# Patient Record
Sex: MALE | Race: BLACK OR AFRICAN AMERICAN | NOT HISPANIC OR LATINO | Employment: OTHER | URBAN - METROPOLITAN AREA
[De-identification: names, ages, dates, MRNs, and addresses within clinical notes are randomized per-mention and may not be internally consistent; named-entity substitution may affect disease eponyms.]

---

## 2019-07-21 ENCOUNTER — HOSPITAL ENCOUNTER (INPATIENT)
Facility: HOSPITAL | Age: 79
LOS: 2 days | Discharge: HOME | DRG: 378 | End: 2019-07-23
Attending: FAMILY MEDICINE | Admitting: FAMILY MEDICINE
Payer: MEDICARE

## 2019-07-21 DIAGNOSIS — R19.5 OCCULT GI BLEEDING: ICD-10-CM

## 2019-07-21 DIAGNOSIS — D62 ACUTE BLOOD LOSS ANEMIA: Primary | ICD-10-CM

## 2019-07-21 DIAGNOSIS — D46.9 MDS (MYELODYSPLASTIC SYNDROME): ICD-10-CM

## 2019-07-21 PROBLEM — I10 HYPERTENSION: Status: ACTIVE | Noted: 2019-07-21

## 2019-07-21 PROCEDURE — 86920 COMPATIBILITY TEST SPIN: CPT

## 2019-07-21 PROCEDURE — 36415 COLL VENOUS BLD VENIPUNCTURE: CPT

## 2019-07-21 PROCEDURE — 86901 BLOOD TYPING SEROLOGIC RH(D): CPT

## 2019-07-21 PROCEDURE — 94761 N-INVAS EAR/PLS OXIMETRY MLT: CPT

## 2019-07-21 PROCEDURE — 11000001 HC ACUTE MED/SURG PRIVATE ROOM

## 2019-07-21 RX ORDER — ONDANSETRON 2 MG/ML
4 INJECTION INTRAMUSCULAR; INTRAVENOUS EVERY 8 HOURS PRN
Status: DISCONTINUED | OUTPATIENT
Start: 2019-07-21 | End: 2019-07-23 | Stop reason: HOSPADM

## 2019-07-21 RX ORDER — RAMELTEON 8 MG/1
8 TABLET ORAL NIGHTLY PRN
Status: DISCONTINUED | OUTPATIENT
Start: 2019-07-21 | End: 2019-07-23 | Stop reason: HOSPADM

## 2019-07-21 RX ORDER — ACETAMINOPHEN 325 MG/1
650 TABLET ORAL EVERY 4 HOURS PRN
Status: DISCONTINUED | OUTPATIENT
Start: 2019-07-21 | End: 2019-07-23 | Stop reason: HOSPADM

## 2019-07-21 RX ORDER — SODIUM CHLORIDE 9 MG/ML
1000 INJECTION, SOLUTION INTRAVENOUS CONTINUOUS
Status: ACTIVE | OUTPATIENT
Start: 2019-07-22 | End: 2019-07-22

## 2019-07-21 RX ORDER — SODIUM CHLORIDE 0.9 % (FLUSH) 0.9 %
10 SYRINGE (ML) INJECTION
Status: DISCONTINUED | OUTPATIENT
Start: 2019-07-21 | End: 2019-07-23 | Stop reason: HOSPADM

## 2019-07-21 RX ORDER — PANTOPRAZOLE SODIUM 40 MG/10ML
40 INJECTION, POWDER, LYOPHILIZED, FOR SOLUTION INTRAVENOUS DAILY
Status: DISCONTINUED | OUTPATIENT
Start: 2019-07-22 | End: 2019-07-22

## 2019-07-21 RX ADMIN — SODIUM CHLORIDE 1000 ML: 0.9 INJECTION, SOLUTION INTRAVENOUS at 10:07

## 2019-07-21 NOTE — PLAN OF CARE
(Physician in Lead of Transfers)   Outside Transfer Acceptance Note / Regional Referral Center    Transferring Physician: Tyrell Zaldivar MD ()    Accepting Physician: Chanel Moon MD    Date of Acceptance: 07/21/2019    Transferring Facility: [facility] --> [facility], Dr Baum     Reason for Transfer: Occult GIB    Report from Transferring Physician/Hospital course: 79M with HTN, chronic pancytopenia 2/2 myelodysplastic syndrome (on Vidaza), with transfusions around every 2 weeks, h/o hemolytic anemia, admitted to [facility] for low Hb mid-5 on routine labs done by his cancer center.  Initially, no report of GIB, so heme/onc consulted, no evidence of hemolytic anemia, then his GIB presented itself with hematochezia (dark red stool), and he went for EGD that was negative.  He continued to bleed and went for repeat EGD and c-scope today, no source of bleed found.  Hb this morning was 6.7, then transfused and 2pm Hb is mid-8.  Rec'd a total of 7 u pRBCs.  WBC 1.5 and plts 70K - chronic and baseline.     Discussed with Dr Corbett, GI staff at [place], who spoke with Dr Gresham who is on tomorrow, and he will be able to do double balloon enteroscopy tomorrow.  Discussed with Dr Baum who will admit.     VS: tachy 104, /90s, O2 sat wnl on RA. Afeb, RR wnl.    To Do List: Admit to . NPO after midnight.     Upon patient arrival to floor, please call  on call.    Chanel Moon MD  Hospital Medicine Staff  Pager: [phone]  Cell: [phone]

## 2019-07-22 ENCOUNTER — ANESTHESIA (OUTPATIENT)
Dept: ENDOSCOPY | Facility: HOSPITAL | Age: 79
DRG: 378 | End: 2019-07-22
Payer: MEDICARE

## 2019-07-22 ENCOUNTER — ANESTHESIA EVENT (OUTPATIENT)
Dept: ENDOSCOPY | Facility: HOSPITAL | Age: 79
DRG: 378 | End: 2019-07-22
Payer: MEDICARE

## 2019-07-22 LAB
ABO + RH BLD: NORMAL
ANION GAP SERPL CALC-SCNC: 5 MMOL/L (ref 8–16)
APTT BLDCRRT: 27.3 SEC (ref 21–32)
BASOPHILS # BLD AUTO: 0 K/UL (ref 0–0.2)
BASOPHILS NFR BLD: 0 % (ref 0–1.9)
BLD GP AB SCN CELLS X3 SERPL QL: NORMAL
BLD PROD TYP BPU: NORMAL
BLOOD UNIT EXPIRATION DATE: NORMAL
BLOOD UNIT TYPE CODE: 9500
BLOOD UNIT TYPE: NORMAL
BUN SERPL-MCNC: 9 MG/DL (ref 8–23)
CALCIUM SERPL-MCNC: 8 MG/DL (ref 8.7–10.5)
CHLORIDE SERPL-SCNC: 108 MMOL/L (ref 95–110)
CO2 SERPL-SCNC: 25 MMOL/L (ref 23–29)
CODING SYSTEM: NORMAL
CREAT SERPL-MCNC: 0.8 MG/DL (ref 0.5–1.4)
DIFFERENTIAL METHOD: ABNORMAL
DISPENSE STATUS: NORMAL
EOSINOPHIL # BLD AUTO: 0 K/UL (ref 0–0.5)
EOSINOPHIL NFR BLD: 1.3 % (ref 0–8)
ERYTHROCYTE [DISTWIDTH] IN BLOOD BY AUTOMATED COUNT: 18.1 % (ref 11.5–14.5)
EST. GFR  (AFRICAN AMERICAN): >60 ML/MIN/1.73 M^2
EST. GFR  (NON AFRICAN AMERICAN): >60 ML/MIN/1.73 M^2
GLUCOSE SERPL-MCNC: 120 MG/DL (ref 70–110)
HCT VFR BLD AUTO: 20 % (ref 40–54)
HGB BLD-MCNC: 6.8 G/DL (ref 14–18)
INR PPP: 1 (ref 0.8–1.2)
LYMPHOCYTES # BLD AUTO: 1.4 K/UL (ref 1–4.8)
LYMPHOCYTES NFR BLD: 91.4 % (ref 18–48)
MCH RBC QN AUTO: 33.3 PG (ref 27–31)
MCHC RBC AUTO-ENTMCNC: 34 G/DL (ref 32–36)
MCV RBC AUTO: 98 FL (ref 82–98)
MONOCYTES # BLD AUTO: 0.1 K/UL (ref 0.3–1)
MONOCYTES NFR BLD: 5.3 % (ref 4–15)
NEUTROPHILS # BLD AUTO: 0 K/UL (ref 1.8–7.7)
NEUTROPHILS NFR BLD: 2 % (ref 38–73)
PLATELET # BLD AUTO: 71 K/UL (ref 150–350)
PLATELET BLD QL SMEAR: ABNORMAL
PMV BLD AUTO: 10.3 FL (ref 9.2–12.9)
POTASSIUM SERPL-SCNC: 3.4 MMOL/L (ref 3.5–5.1)
PROTHROMBIN TIME: 10.8 SEC (ref 9–12.5)
RBC # BLD AUTO: 2.04 M/UL (ref 4.6–6.2)
SODIUM SERPL-SCNC: 138 MMOL/L (ref 136–145)
TRANS ERYTHROCYTES VOL PATIENT: NORMAL ML
WBC # BLD AUTO: 1.51 K/UL (ref 3.9–12.7)

## 2019-07-22 PROCEDURE — 85610 PROTHROMBIN TIME: CPT

## 2019-07-22 PROCEDURE — 85730 THROMBOPLASTIN TIME PARTIAL: CPT

## 2019-07-22 PROCEDURE — 94761 N-INVAS EAR/PLS OXIMETRY MLT: CPT

## 2019-07-22 PROCEDURE — 44376 SMALL BOWEL ENDOSCOPY: CPT | Performed by: INTERNAL MEDICINE

## 2019-07-22 PROCEDURE — 37000009 HC ANESTHESIA EA ADD 15 MINS: Performed by: INTERNAL MEDICINE

## 2019-07-22 PROCEDURE — 37000008 HC ANESTHESIA 1ST 15 MINUTES: Performed by: INTERNAL MEDICINE

## 2019-07-22 PROCEDURE — 27201238 HC BALLOON, OVERTUBE (ANY): Performed by: INTERNAL MEDICINE

## 2019-07-22 PROCEDURE — P9021 RED BLOOD CELLS UNIT: HCPCS

## 2019-07-22 PROCEDURE — 11000001 HC ACUTE MED/SURG PRIVATE ROOM

## 2019-07-22 PROCEDURE — 25000003 PHARM REV CODE 250: Performed by: FAMILY MEDICINE

## 2019-07-22 PROCEDURE — 63600175 PHARM REV CODE 636 W HCPCS: Performed by: FAMILY MEDICINE

## 2019-07-22 PROCEDURE — 85027 COMPLETE CBC AUTOMATED: CPT

## 2019-07-22 PROCEDURE — 80048 BASIC METABOLIC PNL TOTAL CA: CPT

## 2019-07-22 PROCEDURE — 25000003 PHARM REV CODE 250: Performed by: NURSE PRACTITIONER

## 2019-07-22 PROCEDURE — 63600175 PHARM REV CODE 636 W HCPCS: Performed by: NURSE ANESTHETIST, CERTIFIED REGISTERED

## 2019-07-22 PROCEDURE — 25000003 PHARM REV CODE 250: Performed by: NURSE ANESTHETIST, CERTIFIED REGISTERED

## 2019-07-22 PROCEDURE — C9113 INJ PANTOPRAZOLE SODIUM, VIA: HCPCS | Performed by: FAMILY MEDICINE

## 2019-07-22 PROCEDURE — 36415 COLL VENOUS BLD VENIPUNCTURE: CPT

## 2019-07-22 PROCEDURE — 25500020 PHARM REV CODE 255: Performed by: HOSPITALIST

## 2019-07-22 PROCEDURE — 85007 BL SMEAR W/DIFF WBC COUNT: CPT

## 2019-07-22 RX ORDER — PROPOFOL 10 MG/ML
VIAL (ML) INTRAVENOUS
Status: DISCONTINUED | OUTPATIENT
Start: 2019-07-22 | End: 2019-07-22

## 2019-07-22 RX ORDER — SODIUM CHLORIDE 9 MG/ML
INJECTION, SOLUTION INTRAVENOUS CONTINUOUS PRN
Status: DISCONTINUED | OUTPATIENT
Start: 2019-07-22 | End: 2019-07-22

## 2019-07-22 RX ORDER — PROPOFOL 10 MG/ML
VIAL (ML) INTRAVENOUS CONTINUOUS PRN
Status: DISCONTINUED | OUTPATIENT
Start: 2019-07-22 | End: 2019-07-22

## 2019-07-22 RX ORDER — ONDANSETRON 2 MG/ML
INJECTION INTRAMUSCULAR; INTRAVENOUS
Status: DISCONTINUED | OUTPATIENT
Start: 2019-07-22 | End: 2019-07-22

## 2019-07-22 RX ORDER — HYDROCODONE BITARTRATE AND ACETAMINOPHEN 500; 5 MG/1; MG/1
TABLET ORAL
Status: DISCONTINUED | OUTPATIENT
Start: 2019-07-22 | End: 2019-07-23 | Stop reason: HOSPADM

## 2019-07-22 RX ORDER — LIDOCAINE HCL/PF 100 MG/5ML
SYRINGE (ML) INTRAVENOUS
Status: DISCONTINUED | OUTPATIENT
Start: 2019-07-22 | End: 2019-07-22

## 2019-07-22 RX ADMIN — PROPOFOL 70 MG: 10 INJECTION, EMULSION INTRAVENOUS at 01:07

## 2019-07-22 RX ADMIN — LIDOCAINE HYDROCHLORIDE 75 MG: 20 INJECTION, SOLUTION INTRAVENOUS at 01:07

## 2019-07-22 RX ADMIN — OCTREOTIDE ACETATE 25 MCG/HR: 1000 INJECTION, SOLUTION INTRAVENOUS; SUBCUTANEOUS at 07:07

## 2019-07-22 RX ADMIN — PROPOFOL 150 MCG/KG/MIN: 10 INJECTION, EMULSION INTRAVENOUS at 01:07

## 2019-07-22 RX ADMIN — IOHEXOL 130 ML: 350 INJECTION, SOLUTION INTRAVENOUS at 05:07

## 2019-07-22 RX ADMIN — PANTOPRAZOLE SODIUM 8 MG/HR: 40 INJECTION, POWDER, FOR SOLUTION INTRAVENOUS at 04:07

## 2019-07-22 RX ADMIN — PANTOPRAZOLE SODIUM 8 MG/HR: 40 INJECTION, POWDER, FOR SOLUTION INTRAVENOUS at 03:07

## 2019-07-22 RX ADMIN — SODIUM CHLORIDE: 0.9 INJECTION, SOLUTION INTRAVENOUS at 01:07

## 2019-07-22 RX ADMIN — ONDANSETRON 8 MG: 2 INJECTION, SOLUTION INTRAMUSCULAR; INTRAVENOUS at 01:07

## 2019-07-22 NOTE — CONSULTS
[facility]  Consult Note    CC: melena    HPI 79 y.o. male  Mr. 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 for acute on chronic symptomatic anemia requiring multiple transfusions that is associated with obscure GI bleed.     He was initially admitted for symptomatic anemia with Hgb of 5.5 noted on routine outpatient labs. In total he required 7 uPRBC without significant or expected improvement in Hgb. GI evaluated and he underwent EGD with push on 7/19 that was normal. Heme/Onc evaluated, initially believed anemia was 2/2 underlying MDS & Vidaza tx, did not believe picture was due to AIHA. Peripheral smear negative for blasts and negative DIC w/u. Then, Mr. Garcia developed tyler melena without hematemesis. Underwent EGD & colonoscopy 7/21 that found no bleeding source, so he was transferred here for balloon enteroscopy.    Currently he feels fatigued but otherwise is at his baseline. Wants to eat. Last melenic stool was yesterday prior to endoscopies. Denies fevers, chills, chest pain, dyspnea, dysphagia/odynophagia, abdominal pain, n/v, rectal pain, dysuria. Has never had GIB in the past. Last colonoscopy prior to hospitalization was 1-2 years ago, found 2 polyps. No NSAID use. No tobacco or EtOH. No family history GI malignancies. Transferred on protonix and octreotide gtts.    Past Medical History:   Diagnosis Date    Cancer     Skin cancers removed    Hypertension        Past Surgical History:   Procedure Laterality Date    BACK SURGERY      And left shoulder x2    EYE SURGERY      JOINT REPLACEMENT       Social History  Social History     Tobacco Use    Smoking status: Former Smoker     Last attempt to quit: 7/21/2019    Smokeless tobacco: Never Used   Substance Use Topics    Alcohol use: Not Currently    Drug use: Never     Family history: no family history  "of GI malignancies    Review of Systems  General ROS: +fatigue, negative for chills, fever or weight loss  Psychological ROS: negative for hallucination, depression or suicidal ideation  Ophthalmic ROS: negative for blurry vision, photophobia or eye pain  ENT ROS: negative for epistaxis, sore throat or rhinorrhea  Respiratory ROS: no cough, shortness of breath, or wheezing  Cardiovascular ROS: no chest pain or dyspnea on exertion  Gastrointestinal ROS: +black/bloody stools, no abdominal pain, nausea, vomiting  Genito-Urinary ROS: no dysuria, trouble voiding, or hematuria  Musculoskeletal ROS: negative for gait disturbance or muscular weakness  Neurological ROS: no syncope or seizures; no ataxia  Dermatological ROS: negative for pruritis, rash and jaundice    Physical Examination  BP (!) 119/57 (BP Location: Left arm, Patient Position: Lying)   Pulse 82   Temp 97.3 °F (36.3 °C) (Oral)   Resp 18   Ht 5' 9" (1.753 m)   Wt 97.7 kg (215 lb 6.2 oz)   SpO2 (!) 91%   BMI 31.81 kg/m²   General appearance: alert, cooperative, no distress  HENT: Normocephalic, atraumatic, neck symmetrical, no nasal discharge   Eyes: conjunctivae/corneas clear, PERRL, EOM's intact  Lungs: clear to auscultation bilaterally, no dullness to percussion bilaterally  Heart: holosystolic murmur, regular rate and rhythm without rub; no displacement of the PMI   Abdomen: soft, non-tender; bowel sounds normoactive; no organomegaly  Extremities: trace hand edema, otherwise extremities symmetric; no clubbing, cyanosis, or LE edema  Integument: Skin color, texture, turgor normal; no rashes; hair distrubution normal  Neurologic: Alert and oriented X 3, normal strength, normal coordination and gait  Psychiatric: no pressured speech; normal affect; no evidence of impaired cognition     Labs: labs reviewed, pancytopenia with ANC 0, hgb 6.8, plts 71, INR 1.0, BMP unremarkable    Imaging: reviewed OSH EGD report, normal exam    Assessment:   Mr. 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    Plan:   - proceed with upper single balloon enteroscopy today (pt consented)  - transfuse Hgb <7, plts <50 given bleeding    Thank you for this consult. Please call with any questions.    Le Ferraro MD, FRANCISCO  [facility], PGY-4  Cell: [phone]  Pager: [phone]

## 2019-07-22 NOTE — HPI
Constance Garcia is a 79 year old male with a past medical history of Hypertension, Chronic Pancytopenia 2/2 myelodysplastic syndrome (on Vidaza), with transfusions around every 2 weeks, and h/o hemolytic anemia. He lives in [place], [place] with his wife. His PCP is Dr. Harmeet Pickering.    He presented to [facility] 7/21/2019 for a low Hemoglobin of mid-5 on routine labs done by his cancer center. Initially, no report of Gastro Intestinal Bleed, so heme/onc consulted, no evidence of Hemolytic anemia, then his GIB presented itself with Hematochezia (dark red stool), and he went for EGD that was negative.  He continued to bleed and went for repeat EGD and c-scope earlier in the day, no source of bleed found.  Hgb this morning was 6.7, then transfused and 2pm Hgb is mid-8.  Patient received a total of 7 u pRBCs.  WBC 1.5 and plts 70K - chronic and baseline. Discussed with Dr Corbett, GI staff at [place], who spoke with Dr Gresham who is on tomorrow, and he will be able to do double balloon enteroscopy tomorrow. Patient transferred to [facility] and admitted to Hospital medicine for further evaluation and treatment.

## 2019-07-22 NOTE — HOSPITAL COURSE
7/22 pt seen at bedside, no having bloody BM at this time, pt had an EGD today, no acute finding, awaiting CTA, if neg then bleeding is related to his hemotologic disease.       There was no evidence of significant pathology in the entire jejunum.       The proximal ileum appeared normal.  Recommendation:     CTA as next step in evaluation    7/23 the CTA abd was negative for acute evidence of GI bleed. Given the GI recs/scope findings/CTA results and pt's medical history significant for MDS, the pt can be discharged home today and follow up with his PCP and hematologist.  Case discussed with patient at bedside prior to discharge

## 2019-07-22 NOTE — PROGRESS NOTES
[facility]  Progress Note    Patient Name: Constance Garcia  MRN: [id number]  Patient Class: IP- Inpatient   Admission Date: 7/21/2019  Length of Stay: 1 days  Attending Physician: Quirino Soler DO  Primary Care Provider: Harmeet Pickering MD        Subjective:     Principal Problem:Occult GI bleeding      HPI:  Constance Garcia is a 79 year old male with a past medical history of Hypertension, Chronic Pancytopenia 2/2 myelodysplastic syndrome (on Vidaza), with transfusions around every 2 weeks, and h/o hemolytic anemia. He lives in [place], [place] with his wife. His PCP is Dr. Harmeet Pickering.    He presented to [facility] 7/21/2019 for a low Hemoglobin of mid-5 on routine labs done by his cancer center. Initially, no report of Gastro Intestinal Bleed, so heme/onc consulted, no evidence of Hemolytic anemia, then his GIB presented itself with Hematochezia (dark red stool), and he went for EGD that was negative.  He continued to bleed and went for repeat EGD and c-scope earlier in the day, no source of bleed found.  Hgb this morning was 6.7, then transfused and 2pm Hgb is mid-8.  Patient received a total of 7 u pRBCs.  WBC 1.5 and plts 70K - chronic and baseline. Discussed with Dr Corbett, GI staff at [place], who spoke with Dr Gresham who is on tomorrow, and he will be able to do double balloon enteroscopy tomorrow. Patient transferred to [facility] and admitted to Hospital medicine for further evaluation and treatment.    Overview/Hospital Course:  7/22 pt seen at bedside, no having bloody BM at this time, pt had an EGD today, no acute finding, awaiting CTA, if neg then bleeding is related to his hemotologic disease.       There was no evidence of significant pathology in the entire jejunum.       The proximal ileum appeared normal.  Recommendation:     CTA as next step in evaluation    Interval History:     Review of Systems   Constitutional: Positive for fatigue.  Negative for activity change.   HENT: Negative for nosebleeds.    Eyes: Negative for photophobia and visual disturbance.   Respiratory: Negative for cough and shortness of breath.    Cardiovascular: Negative for chest pain and palpitations.   Gastrointestinal: Positive for blood in stool. Negative for nausea.   Neurological: Negative for dizziness and syncope.   Psychiatric/Behavioral: Negative for agitation and decreased concentration.     Objective:     Vital Signs (Most Recent):  Temp: 97.4 °F (36.3 °C) (07/22/19 1517)  Pulse: 66 (07/22/19 1517)  Resp: 16 (07/22/19 1517)  BP: 120/63 (07/22/19 1517)  SpO2: 98 % (07/22/19 1517) Vital Signs (24h Range):  Temp:  [96.7 °F (35.9 °C)-98.2 °F (36.8 °C)] 97.4 °F (36.3 °C)  Pulse:  [66-82] 66  Resp:  [16-22] 16  SpO2:  [91 %-98 %] 98 %  BP: (103-139)/(56-71) 120/63     Weight: 97.7 kg (215 lb 6.2 oz)  Body mass index is 31.81 kg/m².    Intake/Output Summary (Last 24 hours) at 7/22/2019 1535  Last data filed at 7/22/2019 1345  Gross per 24 hour   Intake 100 ml   Output 1325 ml   Net -1225 ml      Physical Exam   Constitutional: He is oriented to person, place, and time. He appears well-developed and well-nourished.   HENT:   Head: Normocephalic and atraumatic.   Eyes: EOM are normal.   Neck: Normal range of motion. Neck supple.   Cardiovascular: Normal rate.   Pulmonary/Chest: Effort normal and breath sounds normal.   Abdominal: Soft. He exhibits no distension.   Musculoskeletal: Normal range of motion. He exhibits no deformity.   Neurological: He is alert and oriented to person, place, and time.   Psychiatric: He has a normal mood and affect. His behavior is normal. Judgment and thought content normal.   Nursing note and vitals reviewed.      Significant Labs: .   Recent Labs   Lab 07/22/19 0622   WBC 1.51*   HGB 6.8*   HCT 20.0*   PLT 71*     Recent Labs   Lab 07/22/19  0622      K 3.4*      CO2 25   BUN 9   CREATININE 0.8   *   CALCIUM 8.0*     Recent  Labs   Lab 07/22/19  0622   INR 1.0      No results for input(s): CPK, CPKMB, MB, TROPONINI in the last 72 hours.  No results for input(s): POCTGLUCOSE in the last 168 hours.  No results found for: HGBA1C  Scheduled Meds:  Continuous Infusions:   octreotide (SANDOSTATIN) infusion Stopped (07/22/19 0330)    pantoprazole 40 mg in dextrose 5 % 100 mL infusion (ready to mix system) 8 mg/hr (07/22/19 1528)     As Needed: sodium chloride, acetaminophen, ondansetron, promethazine (PHENERGAN) IVPB, ramelteon, sodium chloride 0.9%      Significant Imaging: EGD results;   The esophagus was normal.       The stomach was normal.       The examined duodenum was normal.       There was no evidence of significant pathology in the entire jejunum.       The proximal ileum appeared normal.  Impression:           - Normal upper balloon enteroscopy                        - History of recurrent anemia due to MDS                         complicated by pancytopenia                        - Recent diagnosis of reported overt GI bleeding                         without a defined source by EGD, colonoscopy and                         now balloon enteroscopy  Recommendation:       CTA as next step in evaluation      Assessment/Plan:      * Occult GI bleeding  NS 125ml/hr  Protonix 40 mg daily  Monitor CBC  NPO  GI consult pending    7/22  EGD:       There was no evidence of significant pathology in the entire jejunum.       The proximal ileum appeared normal.  Recommendation:     CTA as next step in evaluation    Hypertension  Hyperlipidemia    Continue home meds      Acute blood loss anemia  Anemia is secondary to his underlying MDS and associated Vidaza treatment as well as possible blood loss.  Monitor H&H  Prepare 2 units PRBCs  Transfuse Hgb <7          VTE Risk Mitigation (From admission, onward)        Ordered     Place sequential compression device  Until discontinued      07/21/19 2234     IP VTE HIGH RISK PATIENT  Once       07/21/19 2234                Quirino Soler DO  Department of Hospital Medicine   [facility]

## 2019-07-22 NOTE — PLAN OF CARE
BRIEF [facility] NOTE    Chart reviewed. Mr. 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    He was initially admitted for Hgb of 5.5 noted on routine outpatient labs. In total he required 7 uPRBC without significant or expected improvement in Hgb. GI evaluated and he underwent EGD with push on 7/19 that was normal. Heme/Onc evaluated, initially believed anemia was 2/2 underlying MDS & Vidaza tx, did not believe picture was due to AIHA. Peripheral smear negative for blasts and negative DIC w/u. Then, Mr. Garcia developed tyler melena without hematemesis. Underwent EGD & colonoscopy today 7/21 that found no bleeding source.    Plan is for upper single balloon enteroscopy tomorrow 7/22 for suspected small bowel bleeding.    NPO at midnight.    Full consultation note to follow.    Le Ferraro MD, FRANCISCO  [facility], PGY-4  Cell: [phone]  Pager: [phone]

## 2019-07-22 NOTE — ANESTHESIA POSTPROCEDURE EVALUATION
Anesthesia Post Evaluation    Patient: Constance Garcia    Procedure(s) Performed: Procedure(s) (LRB):  single upper balloon enteroscopy (N/A)    Final Anesthesia Type: MAC  Patient location during evaluation: GI PACU  Patient participation: Yes- Able to Participate  Level of consciousness: awake and alert and oriented  Post-procedure vital signs: reviewed and stable  Pain management: adequate  Airway patency: patent  PONV status at discharge: No PONV  Anesthetic complications: no      Cardiovascular status: blood pressure returned to baseline  Respiratory status: unassisted, spontaneous ventilation and room air  Hydration status: euvolemic  Follow-up not needed.          Vitals Value Taken Time   /57 7/22/2019 11:06 AM   Temp 36.8 °C (98.2 °F) 7/22/2019 11:06 AM   Pulse 78 7/22/2019 11:36 AM   Resp 18 7/22/2019  7:58 AM   SpO2 96 % 7/22/2019 11:06 AM         No case tracking events are documented in the log.      Pain/Michelle Score: No data recorded

## 2019-07-22 NOTE — PLAN OF CARE
VN rounds:  VN cued into pt's room with pt's permission.  Pt sitting on side of bed in low position with call bell near and wife at bedside. Fall risk protocol discussed with pt.  VN instructed to call for assistance.  Pt aware and agreeable.  Pt denies pain, anxiety and nausea.  Educated pt that IV will need to be started prior to CTA.   No acute distress noted.  Allowed time for questions.  Will continue to be available and intervene as needed.

## 2019-07-22 NOTE — PLAN OF CARE
Dr. Greshamvisited at bedside, discussed findings and recommendations with patient and family member; all questions asked and answered. Verbalized understanding of information given.

## 2019-07-22 NOTE — PLAN OF CARE
Problem: Adult Inpatient Plan of Care  Goal: Plan of Care Review  Outcome: Ongoing (interventions implemented as appropriate)  POC discussed with patient and his spouse. IV fluids initiated. IV medications running at ordered rates to port site on left chest wall. Patient has denied any pain or nausea since arrival to hospital room. Informed patient of order for NPO after midnight for possible balloon enteroscopy. Patient voiced understanding of NPO order and will comply.  Patient states that he has been having tyler blood in his stool but has not had a BM since admission. Will be monitoring lab work for H&H results. Will report to MD if level has dropped. Bed alarm activated, patient provided with call light. Will continue to monitor.

## 2019-07-22 NOTE — TRANSFER OF CARE
"Anesthesia Transfer of Care Note    Patient: Constance Garcia    Procedure(s) Performed: Procedure(s) (LRB):  single upper balloon enteroscopy (N/A)    Patient location: GI    Anesthesia Type: MAC    Transport from OR: Transported from OR on room air with adequate spontaneous ventilation    Post pain: adequate analgesia    Post assessment: no apparent anesthetic complications    Post vital signs: stable    Level of consciousness: awake, alert and oriented    Nausea/Vomiting: no nausea/vomiting    Complications: none    Transfer of care protocol was followed      Last vitals:   Visit Vitals  BP (!) 119/57   Pulse 78   Temp 36.8 °C (98.2 °F)   Resp 18   Ht 5' 9" (1.753 m)   Wt 97.7 kg (215 lb 6.2 oz)   SpO2 96%   BMI 31.81 kg/m²     "

## 2019-07-22 NOTE — ASSESSMENT & PLAN NOTE
Anemia is secondary to his underlying MDS and associated Vidaza treatment as well as possible blood loss.  Monitor H&H  Prepare 2 units PRBCs  Transfuse Hgb <7

## 2019-07-22 NOTE — H&P
[facility] Hospital Medicine  History & Physical    Patient Name: Constance Garcia  MRN: [id number]  Admission Date: 7/21/2019  Attending Physician: Betzaida Nevarez*   Primary Care Provider: Harmeet Pickering MD         Patient information was obtained from patient and ER records.     Subjective:     Principal Problem:Occult GI bleeding    Chief Complaint: Abnormal labs       HPI: Constance Garcia is a 79 year old male with a past medical history of Hypertension, Chronic Pancytopenia 2/2 myelodysplastic syndrome (on Vidaza), with transfusions around every 2 weeks, and h/o hemolytic anemia. He lives in [place], [place] with his wife. His PCP is Dr. Harmeet Pickering.    He presented to [facility] 7/21/2019 for a low Hemoglobin of mid-5 on routine labs done by his cancer center. Initially, no report of Gastro Intestinal Bleed, so heme/onc consulted, no evidence of Hemolytic anemia, then his GIB presented itself with Hematochezia (dark red stool), and he went for EGD that was negative.  He continued to bleed and went for repeat EGD and c-scope earlier in the day, no source of bleed found.  Hgb this morning was 6.7, then transfused and 2pm Hgb is mid-8.  Patient received a total of 7 u pRBCs.  WBC 1.5 and plts 70K - chronic and baseline. Discussed with Dr Corbett, GI staff at [place], who spoke with Dr Gresham who is on tomorrow, and he will be able to do double balloon enteroscopy tomorrow. Patient transferred to [facility] and admitted to Hospital medicine for further evaluation and treatment.    Past Medical History:   Diagnosis Date    Cancer     Skin cancers removed    Hypertension        Past Surgical History:   Procedure Laterality Date    BACK SURGERY      And left shoulder x2    EYE SURGERY      JOINT REPLACEMENT         Review of patient's allergies indicates:   Allergen Reactions    Lortab [hydrocodone-acetaminophen] Other (See Comments)     Unknown reaction          No current facility-administered medications on file prior to encounter.      No current outpatient medications on file prior to encounter.     Family History     Problem Relation (Age of Onset)    No Known Problems Mother, Father        Tobacco Use    Smoking status: Former Smoker     Last attempt to quit: 7/21/2019    Smokeless tobacco: Never Used   Substance and Sexual Activity    Alcohol use: Not Currently    Drug use: Never    Sexual activity: Not on file     Review of Systems   Constitutional: Negative for fatigue and fever.   HENT: Negative for congestion and nosebleeds.    Eyes: Negative for visual disturbance.   Respiratory: Negative for cough, choking and shortness of breath.    Cardiovascular: Negative for chest pain and palpitations.   Gastrointestinal: Positive for blood in stool. Negative for abdominal distention, abdominal pain, nausea and vomiting.   Endocrine: Negative for polydipsia and polyuria.   Genitourinary: Negative for dysuria and flank pain.   Musculoskeletal: Negative for arthralgias and joint swelling.   Skin: Negative for color change and rash.   Allergic/Immunologic: Negative for food allergies.   Neurological: Positive for weakness. Negative for dizziness and light-headedness.   Psychiatric/Behavioral: Negative for agitation.     Objective:     Vital Signs (Most Recent):  Temp: 97.6 °F (36.4 °C) (07/21/19 2227)  Pulse: 77 (07/21/19 2227)  Resp: 18 (07/21/19 2227)  BP: 139/71 (07/21/19 2227)  SpO2: 97 % (07/21/19 2227) Vital Signs (24h Range):  Temp:  [96.7 °F (35.9 °C)-97.9 °F (36.6 °C)] 97.6 °F (36.4 °C)  Pulse:  [71-77] 77  Resp:  [17-22] 18  SpO2:  [97 %-98 %] 97 %  BP: (130-139)/(64-71) 139/71     Weight: 98.5 kg (217 lb 2.5 oz)  Body mass index is 32.07 kg/m².    Physical Exam   Constitutional: He appears well-developed and well-nourished.   HENT:   Head: Normocephalic and atraumatic.   Eyes: Pupils are equal, round, and reactive to light.   Neck: Normal range of  motion. No JVD present.   Cardiovascular: Normal rate and regular rhythm.   Pulmonary/Chest: Effort normal and breath sounds normal. No respiratory distress.   Abdominal: Soft. Bowel sounds are normal. He exhibits no distension.   Musculoskeletal: Normal range of motion.   Neurological: He is alert.   Skin: Skin is warm and dry. Capillary refill takes less than 2 seconds.   Psychiatric: He has a normal mood and affect.         CRANIAL NERVES     CN III, IV, VI   Pupils are equal, round, and reactive to light.       Significant Labs: All pertinent labs within the past 24 hours have been reviewed.    Significant Imaging: I have reviewed all pertinent imaging results/findings within the past 24 hours.    Assessment/Plan:     * Occult GI bleeding  NS 125ml/hr  Protonix 40 mg daily  Monitor CBC  NPO  GI consult pending    Hypertension  Hyperlipidemia    Continue home meds      Acute blood loss anemia  Anemia is secondary to his underlying MDS and associated Vidaza treatment as well as possible blood loss.  Monitor H&H  Prepare 2 units PRBCs  Transfuse Hgb <7          VTE Risk Mitigation (From admission, onward)        Ordered     Place sequential compression device  Until discontinued      07/21/19 2234     IP VTE HIGH RISK PATIENT  Once      07/21/19 2234             Crystal Nuñez, APRN, FNP-C  Department of Hospital Medicine   [facility]

## 2019-07-22 NOTE — ANESTHESIA PREPROCEDURE EVALUATION
07/22/2019  Constance Garcia is a 79 y.o., male admitted with occult GI bleeding/anemia. HX of Hypertension, Chronic Pancytopenia 2/2 myelodysplastic syndrome ,  S/p multiple transfusions, now needs for balloon enteroscopy.    Past Medical History:   Diagnosis Date    Cancer     Skin cancers removed    Hypertension          Anesthesia Evaluation    I have reviewed the Patient Summary Reports.    I have reviewed the Nursing Notes.   I have reviewed the Medications.     Review of Systems  Social:  Former Smoker        Physical Exam  General:  Obesity    Airway/Jaw/Neck:  Airway Findings: Mallampati: IV Jaw/Neck Findings:  Neck ROM: Extension Decreased, Mild      Dental:  Dental Findings: Upper partial dentures   Chest/Lungs:  Chest/Lungs Clear    Heart/Vascular:  Heart Findings: Normal       Mental Status:  Mental Status Findings:  Alert and Oriented       Lab Results   Component Value Date    WBC 1.51 (LL) 07/22/2019    HGB 6.8 (L) 07/22/2019    HCT 20.0 (L) 07/22/2019    PLT 71 (L) 07/22/2019     07/22/2019    K 3.4 (L) 07/22/2019     07/22/2019    CREATININE 0.8 07/22/2019    BUN 9 07/22/2019    CO2 25 07/22/2019    INR 1.0 07/22/2019     Normal sinus rhythm  Left ventricular hypertrophy with QRS widening and repolarization abnormality  Nonspecific intraventricular conduction delay  Abnormal ECG  When compared with ECG of 15-SEP-2018 13:27,  No significant change was found  Confirmed by Yamil Ramirez M.D. ([id number]) on 7/18/2019 11:12:38 AM    Anesthesia Plan  Type of Anesthesia, risks & benefits discussed:  Anesthesia Type:  MAC, general  Patient's Preference:   Intra-op Monitoring Plan:   Intra-op Monitoring Plan Comments:   Post Op Pain Control Plan:   Post Op Pain Control Plan Comments:   Induction:    Beta Blocker:  Patient is not currently on a Beta-Blocker (No further documentation required).        Informed Consent: Patient understands risks and agrees with Anesthesia plan.  Questions answered. Anesthesia consent signed with patient.  ASA Score: 3     Day of Surgery Review of History & Physical:            Ready For Surgery From Anesthesia Perspective.

## 2019-07-22 NOTE — [employer] PATIENT INSTRUCTIONS
Discharge Summary/Instructions after an Endoscopic Procedure  Patient Name: Constance Garcia  Patient MRN: [id number]  Patient YOB: 1940 Monday, July 22, 2019  Pasquale Gresham MD  RESTRICTIONS:  During your procedure today, you received medications for sedation.  These   medications may affect your judgment, balance and coordination.  Therefore,   for 24 hours, you have the following restrictions:   - DO NOT drive a car, operate machinery, make legal/financial decisions,   sign important papers or drink alcohol.    ACTIVITY:  Today: no heavy lifting, straining or running due to procedural   sedation/anesthesia.  The following day: return to full activity including work.  DIET:  Eat and drink normally unless instructed otherwise.     TREATMENT FOR COMMON SIDE EFFECTS:  - Mild abdominal pain, nausea, belching, bloating or excessive gas:  rest,   eat lightly and use a heating pad.  - Sore Throat: treat with throat lozenges and/or gargle with warm salt   water.  - Because air was used during the procedure, expelling large amounts of air   from your rectum or belching is normal.  - If a bowel prep was taken, you may not have a bowel movement for 1-3 days.    This is normal.  SYMPTOMS TO WATCH FOR AND REPORT TO YOUR PHYSICIAN:  1. Abdominal pain or bloating, other than gas cramps.  2. Chest pain.  3. Back pain.  4. Signs of infection such as: chills or fever occurring within 24 hours   after the procedure.  5. Rectal bleeding, which would show as bright red, maroon, or black stools.   (A tablespoon of blood from the rectum is not serious, especially if   hemorrhoids are present.)  6. Vomiting.  7. Weakness or dizziness.  GO DIRECTLY TO THE NEAREST EMERGENCY ROOM IF YOU HAVE ANY OF THE FOLLOWING:      Difficulty breathing              Chills and/or fever over 101 F   Persistent vomiting and/or vomiting blood   Severe abdominal pain   Severe chest pain   Black, tarry stools   Bleeding- more than one tablespoon   Any  other symptom or condition that you feel may need urgent attention  Your doctor recommends these additional instructions:  If any biopsies were taken, your doctors clinic will contact you in 1 to 2   weeks with any results.  CTA as next step in evaluation  For questions, problems or results please call your physician - Pasquale Gresham MD at Work:  [phone].  EMERGENCY PHONE NUMBER: [phone],  LAB RESULTS: [phone]  IF A COMPLICATION OR EMERGENCY SITUATION ARISES AND YOU ARE UNABLE TO REACH   YOUR PHYSICIAN - GO DIRECTLY TO THE EMERGENCY ROOM.  MD Pasquale Cortes MD  7/22/2019 1:53:35 PM  This report has been verified and signed electronically.  [employer]

## 2019-07-22 NOTE — SUBJECTIVE & OBJECTIVE
Past Medical History:   Diagnosis Date    Cancer     Skin cancers removed    Hypertension        Past Surgical History:   Procedure Laterality Date    BACK SURGERY      And left shoulder x2    EYE SURGERY      JOINT REPLACEMENT         Review of patient's allergies indicates:   Allergen Reactions    Lortab [hydrocodone-acetaminophen] Other (See Comments)     Unknown reaction         No current facility-administered medications on file prior to encounter.      No current outpatient medications on file prior to encounter.     Family History     Problem Relation (Age of Onset)    No Known Problems Mother, Father        Tobacco Use    Smoking status: Former Smoker     Last attempt to quit: 7/21/2019    Smokeless tobacco: Never Used   Substance and Sexual Activity    Alcohol use: Not Currently    Drug use: Never    Sexual activity: Not on file     Review of Systems   Constitutional: Negative for fatigue and fever.   HENT: Negative for congestion and nosebleeds.    Eyes: Negative for visual disturbance.   Respiratory: Negative for cough, choking and shortness of breath.    Cardiovascular: Negative for chest pain and palpitations.   Gastrointestinal: Positive for blood in stool. Negative for abdominal distention, abdominal pain, nausea and vomiting.   Endocrine: Negative for polydipsia and polyuria.   Genitourinary: Negative for dysuria and flank pain.   Musculoskeletal: Negative for arthralgias and joint swelling.   Skin: Negative for color change and rash.   Allergic/Immunologic: Negative for food allergies.   Neurological: Positive for weakness. Negative for dizziness and light-headedness.   Psychiatric/Behavioral: Negative for agitation.     Objective:     Vital Signs (Most Recent):  Temp: 97.6 °F (36.4 °C) (07/21/19 2227)  Pulse: 77 (07/21/19 2227)  Resp: 18 (07/21/19 2227)  BP: 139/71 (07/21/19 2227)  SpO2: 97 % (07/21/19 2227) Vital Signs (24h Range):  Temp:  [96.7 °F (35.9 °C)-97.9 °F (36.6 °C)] 97.6 °F  (36.4 °C)  Pulse:  [71-77] 77  Resp:  [17-22] 18  SpO2:  [97 %-98 %] 97 %  BP: (130-139)/(64-71) 139/71     Weight: 98.5 kg (217 lb 2.5 oz)  Body mass index is 32.07 kg/m².    Physical Exam   Constitutional: He appears well-developed and well-nourished.   HENT:   Head: Normocephalic and atraumatic.   Eyes: Pupils are equal, round, and reactive to light.   Neck: Normal range of motion. No JVD present.   Cardiovascular: Normal rate and regular rhythm.   Pulmonary/Chest: Effort normal and breath sounds normal. No respiratory distress.   Abdominal: Soft. Bowel sounds are normal. He exhibits no distension.   Musculoskeletal: Normal range of motion.   Neurological: He is alert.   Skin: Skin is warm and dry. Capillary refill takes less than 2 seconds.   Psychiatric: He has a normal mood and affect.         CRANIAL NERVES     CN III, IV, VI   Pupils are equal, round, and reactive to light.       Significant Labs: All pertinent labs within the past 24 hours have been reviewed.    Significant Imaging: I have reviewed all pertinent imaging results/findings within the past 24 hours.

## 2019-07-22 NOTE — PLAN OF CARE
Pt is from [facility] in [place], [place].  He will transfer back there once treatment completed here.  His spouse, Suzie, is at his bedside.  He was independent prior to hospital admission.  White board updated with CM name and contact information.  Discharge brochure provided.  Pt encouraged to call with any questions or concerns.  Cm will continue to follow pt through transitions of care and assist with any discharge needs.       07/22/19 1605   Discharge Assessment   Assessment Type Discharge Planning Assessment   Confirmed/corrected address and phone number on facesheet? Yes   Assessment information obtained from? Patient;Caregiver   Communicated expected length of stay with patient/caregiver yes   Prior to hospitilization cognitive status: Alert/Oriented   Prior to hospitalization functional status: Independent   Current cognitive status: Alert/Oriented   Current Functional Status: Independent   Facility Arrived From: [facility]   Lives With spouse   Able to Return to Prior Arrangements yes   Is patient able to care for self after discharge? Yes   Patient's perception of discharge disposition home or selfcare   Readmission Within the Last 30 Days no previous admission in last 30 days   Patient currently being followed by outpatient case management? No   Patient currently receives any other outside agency services? No   Equipment Currently Used at Home none   Do you have any problems affording any of your prescribed medications? No   Is the patient taking medications as prescribed? yes   Does the patient have transportation home? Yes   Transportation Anticipated family or friend will provide   Discharge Plan A Other   Discharge Plan B Other   DME Needed Upon Discharge  none   Patient/Family in Agreement with Plan yes     Mason Avilez RN,   [phone]

## 2019-07-22 NOTE — SUBJECTIVE & OBJECTIVE
Interval History:     Review of Systems   Constitutional: Positive for fatigue. Negative for activity change.   HENT: Negative for nosebleeds.    Eyes: Negative for photophobia and visual disturbance.   Respiratory: Negative for cough and shortness of breath.    Cardiovascular: Negative for chest pain and palpitations.   Gastrointestinal: Positive for blood in stool. Negative for nausea.   Neurological: Negative for dizziness and syncope.   Psychiatric/Behavioral: Negative for agitation and decreased concentration.     Objective:     Vital Signs (Most Recent):  Temp: 97.4 °F (36.3 °C) (07/22/19 1517)  Pulse: 66 (07/22/19 1517)  Resp: 16 (07/22/19 1517)  BP: 120/63 (07/22/19 1517)  SpO2: 98 % (07/22/19 1517) Vital Signs (24h Range):  Temp:  [96.7 °F (35.9 °C)-98.2 °F (36.8 °C)] 97.4 °F (36.3 °C)  Pulse:  [66-82] 66  Resp:  [16-22] 16  SpO2:  [91 %-98 %] 98 %  BP: (103-139)/(56-71) 120/63     Weight: 97.7 kg (215 lb 6.2 oz)  Body mass index is 31.81 kg/m².    Intake/Output Summary (Last 24 hours) at 7/22/2019 1535  Last data filed at 7/22/2019 1345  Gross per 24 hour   Intake 100 ml   Output 1325 ml   Net -1225 ml      Physical Exam   Constitutional: He is oriented to person, place, and time. He appears well-developed and well-nourished.   HENT:   Head: Normocephalic and atraumatic.   Eyes: EOM are normal.   Neck: Normal range of motion. Neck supple.   Cardiovascular: Normal rate.   Pulmonary/Chest: Effort normal and breath sounds normal.   Abdominal: Soft. He exhibits no distension.   Musculoskeletal: Normal range of motion. He exhibits no deformity.   Neurological: He is alert and oriented to person, place, and time.   Psychiatric: He has a normal mood and affect. His behavior is normal. Judgment and thought content normal.   Nursing note and vitals reviewed.      Significant Labs: .   Recent Labs   Lab 07/22/19  0622   WBC 1.51*   HGB 6.8*   HCT 20.0*   PLT 71*     Recent Labs   Lab 07/22/19  0622      K 3.4*       CO2 25   BUN 9   CREATININE 0.8   *   CALCIUM 8.0*     Recent Labs   Lab 07/22/19  0622   INR 1.0      No results for input(s): CPK, CPKMB, MB, TROPONINI in the last 72 hours.  No results for input(s): POCTGLUCOSE in the last 168 hours.  No results found for: HGBA1C  Scheduled Meds:  Continuous Infusions:   octreotide (SANDOSTATIN) infusion Stopped (07/22/19 0330)    pantoprazole 40 mg in dextrose 5 % 100 mL infusion (ready to mix system) 8 mg/hr (07/22/19 1528)     As Needed: sodium chloride, acetaminophen, ondansetron, promethazine (PHENERGAN) IVPB, ramelteon, sodium chloride 0.9%      Significant Imaging: EGD results;   The esophagus was normal.       The stomach was normal.       The examined duodenum was normal.       There was no evidence of significant pathology in the entire jejunum.       The proximal ileum appeared normal.  Impression:           - Normal upper balloon enteroscopy                        - History of recurrent anemia due to MDS                         complicated by pancytopenia                        - Recent diagnosis of reported overt GI bleeding                         without a defined source by EGD, colonoscopy and                         now balloon enteroscopy  Recommendation:       CTA as next step in evaluation

## 2019-07-22 NOTE — OR NURSING
Patient added on to endo schedule for Upper SBE with Dr. Gresham. Report taken from Sho in care of patient this morning. NPO status appropriate for exam. Blood transfusion to be started shortly. Chart reviewed.

## 2019-07-22 NOTE — ASSESSMENT & PLAN NOTE
NS 125ml/hr  Protonix 40 mg daily  Monitor CBC  NPO  GI consult pending    7/22  EGD:       There was no evidence of significant pathology in the entire jejunum.       The proximal ileum appeared normal.  Recommendation:     CTA as next step in evaluation

## 2019-07-22 NOTE — PLAN OF CARE
Pt transferred down to Endoscopy, Sho ARAGON at bedside, carrying blood for blood transfusion.  Patient's VS obtained.  Blood verified and started to L chest POC and IV pete and protonix rotated to R hand PIV.  Wife at Bedside.

## 2019-07-22 NOTE — PLAN OF CARE
Anterograde balloon enteroscopy unrevealing. There is a report of hemotochezia in this case but no bleeding sources have been found by EGD, colonoscopy or video capsule study.   Plan:   CTA today   If CTA is negative for active bleeding sources, this patient's recurrent anemia may be primarily related to his hematologic disease rather than GI bleeding   Regular diet after CTA

## 2019-07-23 ENCOUNTER — TELEPHONE (OUTPATIENT)
Dept: HEMATOLOGY/ONCOLOGY | Facility: CLINIC | Age: 79
End: 2019-07-23

## 2019-07-23 VITALS
RESPIRATION RATE: 17 BRPM | SYSTOLIC BLOOD PRESSURE: 125 MMHG | TEMPERATURE: 98 F | BODY MASS INDEX: 32.13 KG/M2 | HEIGHT: 69 IN | WEIGHT: 216.94 LBS | HEART RATE: 74 BPM | OXYGEN SATURATION: 97 % | DIASTOLIC BLOOD PRESSURE: 58 MMHG

## 2019-07-23 PROBLEM — D46.9 MDS (MYELODYSPLASTIC SYNDROME): Status: ACTIVE | Noted: 2019-07-23

## 2019-07-23 LAB
BASOPHILS # BLD AUTO: ABNORMAL K/UL (ref 0–0.2)
BASOPHILS NFR BLD: 0 % (ref 0–1.9)
DIFFERENTIAL METHOD: ABNORMAL
EOSINOPHIL # BLD AUTO: ABNORMAL K/UL (ref 0–0.5)
EOSINOPHIL NFR BLD: 0 % (ref 0–8)
ERYTHROCYTE [DISTWIDTH] IN BLOOD BY AUTOMATED COUNT: 17.1 % (ref 11.5–14.5)
HCT VFR BLD AUTO: 22.7 % (ref 40–54)
HGB BLD-MCNC: 7.6 G/DL (ref 14–18)
LYMPHOCYTES # BLD AUTO: ABNORMAL K/UL (ref 1–4.8)
LYMPHOCYTES NFR BLD: 100 % (ref 18–48)
MCH RBC QN AUTO: 31.9 PG (ref 27–31)
MCHC RBC AUTO-ENTMCNC: 33.5 G/DL (ref 32–36)
MCV RBC AUTO: 95 FL (ref 82–98)
MONOCYTES # BLD AUTO: ABNORMAL K/UL (ref 0.3–1)
MONOCYTES NFR BLD: 0 % (ref 4–15)
NEUTROPHILS NFR BLD: 0 % (ref 38–73)
PLATELET # BLD AUTO: 74 K/UL (ref 150–350)
PLATELET BLD QL SMEAR: ABNORMAL
PMV BLD AUTO: 10.6 FL (ref 9.2–12.9)
RBC # BLD AUTO: 2.38 M/UL (ref 4.6–6.2)
WBC # BLD AUTO: 1.46 K/UL (ref 3.9–12.7)

## 2019-07-23 PROCEDURE — 99223 1ST HOSP IP/OBS HIGH 75: CPT | Mod: ,,, | Performed by: INTERNAL MEDICINE

## 2019-07-23 PROCEDURE — 85025 COMPLETE CBC W/AUTO DIFF WBC: CPT

## 2019-07-23 PROCEDURE — 94761 N-INVAS EAR/PLS OXIMETRY MLT: CPT

## 2019-07-23 PROCEDURE — 63600175 PHARM REV CODE 636 W HCPCS: Performed by: HOSPITALIST

## 2019-07-23 PROCEDURE — 99223 PR INITIAL HOSPITAL CARE,LEVL III: ICD-10-PCS | Mod: ,,, | Performed by: INTERNAL MEDICINE

## 2019-07-23 RX ORDER — HEPARIN 100 UNIT/ML
5 SYRINGE INTRAVENOUS
Status: DISCONTINUED | OUTPATIENT
Start: 2019-07-23 | End: 2019-07-23 | Stop reason: HOSPADM

## 2019-07-23 RX ADMIN — HEPARIN 500 UNITS: 100 SYRINGE at 12:07

## 2019-07-23 NOTE — PLAN OF CARE
Discharge rounds on patient. Discussed followup appointments, blue discharge folder, discharge nurse will go over home medications and reasons for medications and final discharge instructions. All patient/caregiver questions answered. Patient verbalized understanding.      ok to fax d/c summ to Dr. Jayme Mak  fax #  [phone]    pt has transportation to home and help at home        07/23/19 4745   Final Note   Assessment Type Final Discharge Note   Anticipated Discharge Disposition Home   What phone number can be called within the next 1-3 days to see how you are doing after discharge? [phone]   Hospital Follow Up  Appt(s) scheduled? Yes   Discharge plans and expectations educations in teach back method with documentation complete? Yes   Right Care Referral Info   Post Acute Recommendation No Care   Referral Type   (no care )

## 2019-07-23 NOTE — NURSING
Pt and spouse given discharge instructions and avs packet.  Pt he takes home medication but is not sure what he taken. Nurse found transfer papers from [facility]   With medications he was taking. Nurse spoke with Dr. Soler about medications currently taking and Per Dr. Soler okay to take at home. Pt and spouse informed okay to start taking medications again as he did at home.

## 2019-07-23 NOTE — PROGRESS NOTES
[facility]  Consult Note     CC: melena     HPI 79 y.o. male  Mr. 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     He was initially admitted for symptomatic anemia with Hgb of 5.5 noted on routine outpatient labs. In total he required 7 uPRBC without significant or expected improvement in Hgb. GI evaluated and he underwent EGD with push on 7/19 that was normal. Heme/Onc evaluated, initially believed anemia was 2/2 underlying MDS & Vidaza tx, did not believe picture was due to AIHA. Peripheral smear negative for blasts and negative DIC w/u. Then, Mr. Garcia developed tyler melena without hematemesis. Underwent EGD & colonoscopy 7/21 that found no bleeding source, so he was transferred here for balloon enteroscopy. Balloon enteroscopy was performed by Dr. Gresham, which did not reveal the source of bleeding. Thus, CTA abd/pelvis was performed, which was also negative.          Past Medical History:   Diagnosis Date    Cancer       Skin cancers removed    Hypertension                 Past Surgical History:   Procedure Laterality Date    BACK SURGERY         And left shoulder x2    EYE SURGERY        JOINT REPLACEMENT          Social History  Social History            Tobacco Use    Smoking status: Former Smoker       Last attempt to quit: 7/21/2019    Smokeless tobacco: Never Used   Substance Use Topics    Alcohol use: Not Currently    Drug use: Never      Family history: no family history of GI malignancies     Subjective:  Pt seen and examined this morning at 0800. Afebrile, VSS, AaOx3. Returning to bed from sink with wife at bedside. Discussed with patient EGD findings (normal) and CT findings (normal). Pt continues to feel fatigued, but does feel much  "better this morning and is glad to be eating again. Still no BMs as has not had full meal in several days. Feels ready to be discharged.   Denies CP, SOB, N/V, F/C, HA/dizziness.    Review of Systems  General ROS: +fatigue, negative for chills, fever or weight loss  Psychological ROS: negative for hallucination, depression or suicidal ideation  Ophthalmic ROS: negative for blurry vision, photophobia or eye pain  ENT ROS: negative for epistaxis, sore throat or rhinorrhea  Respiratory ROS: no cough, shortness of breath, or wheezing  Cardiovascular ROS: no chest pain or dyspnea on exertion  Gastrointestinal ROS: no abdominal pain, nausea, vomiting  Genito-Urinary ROS: no dysuria, trouble voiding, or hematuria  Musculoskeletal ROS: negative for gait disturbance or muscular weakness  Neurological ROS: no syncope or seizures; no ataxia  Dermatological ROS: negative for pruritis, rash and jaundice     Physical Examination  BP (!) 119/57 (BP Location: Left arm, Patient Position: Lying)   Pulse 82   Temp 97.3 °F (36.3 °C) (Oral)   Resp 18   Ht 5' 9" (1.753 m)   Wt 97.7 kg (215 lb 6.2 oz)   SpO2 (!) 91%   BMI 31.81 kg/m²   General appearance: alert, cooperative, no distress  HENT: Normocephalic, atraumatic, neck symmetrical, no nasal discharge   Eyes: conjunctivae/corneas clear, PERRL, EOM's intact  Lungs: clear to auscultation bilaterally, no dullness to percussion bilaterally  Heart: holosystolic murmur, regular rate and rhythm without rub; no displacement of the PMI   Abdomen: soft, non-tender; bowel sounds normoactive; no organomegaly  Extremities: trace hand edema, otherwise extremities symmetric; no clubbing, cyanosis, or LE edema  Integument: Skin color, texture, turgor normal; no rashes; hair distrubution normal  Neurologic: Alert and oriented X 3, normal strength, normal coordination and gait  Psychiatric: no pressured speech; normal affect; no evidence of impaired cognition      Labs: labs reviewed, " pancytopenia with ANC 0, hgb 7.6, plts 74, INR 1.0, BMP unremarkable     Imaging/Procedures:   - reviewed OSH EGD report, normal exam.  - 7/22/19 Double Balloon Enteroscopy  - The esophagus was normal.       The stomach was normal.       The examined duodenum was normal.       There was no evidence of significant pathology in the entire jejunum.       The proximal ileum appeared normal.     Assessment:   Mr. 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 No source of bleeding identified by EGD, colonoscopy, or video capsule study. CTA also unrevealing. Possible that this patient's recurrent anemia is primarily related to hematologic disease rather than GI bleeding.      Plan:   - Followup with Hematology   - Regular diet  - Okay for discharge from GI standpoint     Thank you for this consult. Please call with any questions.

## 2019-07-23 NOTE — HPI
79 year-old male with high risk myelodysplastic syndrome with excess blasts on azacitidine, a history of autoimmune hemolytic anemia, was admitted on 7/21/19 as a transfer from [facility] for concern of acute blood-loss anemia from possible gastrointestinal bleed. Although patient had documented hematochezia, gastrointestinal evaluation was negative. Consult is for consideration of other sources of anemia.    - today, patient feels good and is hoping to be discharged. He states that the azacitidine has been working and that the frequency of his outpatient blood transfusions had decreased to every 3-4 weeks. He confirms that he did see blood in his stool last week. His last bone marrow biopsy was on 5/6/19. He endorses fatigue, dyspnea upon exertion. He denies chest pain, nausea, vomiting, diarrhea, constipation.

## 2019-07-23 NOTE — SUBJECTIVE & OBJECTIVE
Oncology Treatment Plan:   [No treatment plan]    Medications:  Continuous Infusions:   octreotide (SANDOSTATIN) infusion 25 mcg/hr (07/22/19 1926)     Scheduled Meds:  PRN Meds:sodium chloride, acetaminophen, ondansetron, promethazine (PHENERGAN) IVPB, ramelteon, sodium chloride 0.9%     Review of patient's allergies indicates:   Allergen Reactions    Lortab [hydrocodone-acetaminophen] Other (See Comments)     Unknown reaction          Past Medical History:   Diagnosis Date    Cancer     Skin cancers removed    Hypertension      Past Surgical History:   Procedure Laterality Date    BACK SURGERY      And left shoulder x2    EYE SURGERY      JOINT REPLACEMENT      single upper balloon enteroscopy N/A 7/22/2019    Performed by Pasquale Gresham [facility] at [facility]    Upper SBE  07/22/2019     Family History     Problem Relation (Age of Onset)    No Known Problems Mother, Father        Tobacco Use    Smoking status: Former Smoker     Last attempt to quit: 7/21/2019    Smokeless tobacco: Never Used   Substance and Sexual Activity    Alcohol use: Not Currently    Drug use: Never    Sexual activity: Not on file       Review of Systems   Constitutional: Positive for fatigue.   HENT: Negative for sore throat.    Eyes: Negative for visual disturbance.   Respiratory: Positive for shortness of breath.    Cardiovascular: Negative for chest pain.   Gastrointestinal: Negative for abdominal pain.   Genitourinary: Negative for dysuria and hematuria.   Musculoskeletal: Negative for back pain.   Skin: Positive for pallor.   Neurological: Negative for headaches.   Hematological: Negative for adenopathy. Bruises/bleeds easily.   Psychiatric/Behavioral: The patient is not nervous/anxious.      Objective:     Vital Signs (Most Recent):  Temp: 97.9 °F (36.6 °C) (07/23/19 0755)  Pulse: 74 (07/23/19 0800)  Resp: 18 (07/23/19 0755)  BP: (!) 109/59 (07/23/19 0755)  SpO2: 98 % (07/23/19 0748) Vital Signs (24h Range):  Temp:  [97.4 °F  (36.3 °C)-98.2 °F (36.8 °C)] 97.9 °F (36.6 °C)  Pulse:  [59-80] 74  Resp:  [16-20] 18  SpO2:  [92 %-98 %] 98 %  BP: (103-142)/(57-76) 109/59     Weight: 98.4 kg (216 lb 14.9 oz)  Body mass index is 32.04 kg/m².  Body surface area is 2.19 meters squared.      Intake/Output Summary (Last 24 hours) at 7/23/2019 0855  Last data filed at 7/23/2019 0600  Gross per 24 hour   Intake 1052.83 ml   Output 1000 ml   Net 52.83 ml       Physical Exam   Constitutional: He is oriented to person, place, and time. He appears well-developed and well-nourished.   HENT:   Head: Normocephalic and atraumatic.   Eyes: Pupils are equal, round, and reactive to light. EOM are normal.   Neck: Normal range of motion. Neck supple.   Cardiovascular: Normal rate and regular rhythm.   Pulmonary/Chest: Effort normal and breath sounds normal.   Abdominal: Soft. Bowel sounds are normal.   Musculoskeletal: Normal range of motion. He exhibits no edema.   Neurological: He is alert and oriented to person, place, and time.   Skin: Skin is warm and dry.   Psychiatric: He has a normal mood and affect. His behavior is normal. Judgment and thought content normal.   Nursing note and vitals reviewed.      Significant Labs:   Labs have been reviewed.    Lab Results   Component Value Date    WBC 1.46 (LL) 07/23/2019    HGB 7.6 (L) 07/23/2019    HCT 22.7 (L) 07/23/2019    MCV 95 07/23/2019    PLT 74 (L) 07/23/2019         Diagnostic Results:  Bone marrow biopsy (5/6/19):  - In comparison to the prior marrow in October 2018 the marrow cellularity appears somewhat diminished at around 30%.  Blasts also appear to be somewhat decreased as a percentage of white cells, and I concur with the flow cytometry impression of 10% blast forms.  The clonal B-cell and plasma cell populations appear unchanged.  Plasmacytosis remains at approximately 7% with focal atypical plasma cell cytology.  Iron stains show increased stainable marrow iron within macrophages.  No ring  sideroblasts are present.    Pathologist review of peripheral smear (7/18/19):  - Pancytopenia including anemia, macrocytic, severe; thrombocytopenia, moderate and leukopenia with severe granulocytopenia.  Negative for overt dyspoiesis or immature cells such as blasts.  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

## 2019-07-23 NOTE — CONSULTS
[facility]  Hematology/Oncology  Consult Note    Patient Name: Constance Garcia  MRN: [id number]  Admission Date: 7/21/2019  Hospital Length of Stay: 2 days  Code Status: Full Code   Attending Provider: Quirino Soler DO  Consulting Provider: Satinder Marley MD  Primary Care Physician: Harmeet Pickering MD  Principal Problem:Occult GI bleeding    Inpatient consult to Hematology/Oncology  Consult performed by: Satinder Marley MD  Consult ordered by: Le Ferraro MD  Reason for consult: MDS, CLL/SLL, history of AIHA        Subjective:     HPI:  79 year-old male with high risk myelodysplastic syndrome with excess blasts on azacitidine, a history of autoimmune hemolytic anemia, was admitted on 7/21/19 as a transfer from [facility] for concern of acute blood-loss anemia from possible gastrointestinal bleed. Although patient had documented hematochezia, gastrointestinal evaluation was negative. Consult is for consideration of other sources of anemia.    - today, patient feels good and is hoping to be discharged. He states that the azacitidine has been working and that the frequency of his outpatient blood transfusions had decreased to every 3-4 weeks. He confirms that he did see blood in his stool last week. His last bone marrow biopsy was on 5/6/19. He endorses fatigue, dyspnea upon exertion. He denies chest pain, nausea, vomiting, diarrhea, constipation.      Oncology Treatment Plan:   [No treatment plan]    Medications:  Continuous Infusions:   octreotide (SANDOSTATIN) infusion 25 mcg/hr (07/22/19 1926)     Scheduled Meds:  PRN Meds:sodium chloride, acetaminophen, ondansetron, promethazine (PHENERGAN) IVPB, ramelteon, sodium chloride 0.9%     Review of patient's allergies indicates:   Allergen Reactions    Lortab [hydrocodone-acetaminophen] Other (See Comments)     Unknown reaction          Past Medical History:   Diagnosis Date    Cancer     Skin cancers removed    Hypertension       Past Surgical History:   Procedure Laterality Date    BACK SURGERY      And left shoulder x2    EYE SURGERY      JOINT REPLACEMENT      single upper balloon enteroscopy N/A 7/22/2019    Per[facility]    Upper SBE  07/22/2019     Family History     Problem Relation (Age of Onset)    No Known Problems Mother, Father        Tobacco Use    Smoking status: Former Smoker     Last attempt to quit: 7/21/2019    Smokeless tobacco: Never Used   Substance and Sexual Activity    Alcohol use: Not Currently    Drug use: Never    Sexual activity: Not on file       Review of Systems   Constitutional: Positive for fatigue.   HENT: Negative for sore throat.    Eyes: Negative for visual disturbance.   Respiratory: Positive for shortness of breath.    Cardiovascular: Negative for chest pain.   Gastrointestinal: Negative for abdominal pain.   Genitourinary: Negative for dysuria and hematuria.   Musculoskeletal: Negative for back pain.   Skin: Positive for pallor.   Neurological: Negative for headaches.   Hematological: Negative for adenopathy. Bruises/bleeds easily.   Psychiatric/Behavioral: The patient is not nervous/anxious.      Objective:     Vital Signs (Most Recent):  Temp: 97.9 °F (36.6 °C) (07/23/19 0755)  Pulse: 74 (07/23/19 0800)  Resp: 18 (07/23/19 0755)  BP: (!) 109/59 (07/23/19 0755)  SpO2: 98 % (07/23/19 0748) Vital Signs (24h Range):  Temp:  [97.4 °F (36.3 °C)-98.2 °F (36.8 °C)] 97.9 °F (36.6 °C)  Pulse:  [59-80] 74  Resp:  [16-20] 18  SpO2:  [92 %-98 %] 98 %  BP: (103-142)/(57-76) 109/59     Weight: 98.4 kg (216 lb 14.9 oz)  Body mass index is 32.04 kg/m².  Body surface area is 2.19 meters squared.      Intake/Output Summary (Last 24 hours) at 7/23/2019 0855  Last data filed at 7/23/2019 0600  Gross per 24 hour   Intake 1052.83 ml   Output 1000 ml   Net 52.83 ml       Physical Exam   Constitutional: He is oriented to person, place, and time. He appears well-developed and  well-nourished.   HENT:   Head: Normocephalic and atraumatic.   Eyes: Pupils are equal, round, and reactive to light. EOM are normal.   Neck: Normal range of motion. Neck supple.   Cardiovascular: Normal rate and regular rhythm.   Pulmonary/Chest: Effort normal and breath sounds normal.   Abdominal: Soft. Bowel sounds are normal.   Musculoskeletal: Normal range of motion. He exhibits no edema.   Neurological: He is alert and oriented to person, place, and time.   Skin: Skin is warm and dry.   Psychiatric: He has a normal mood and affect. His behavior is normal. Judgment and thought content normal.   Nursing note and vitals reviewed.      Significant Labs:   Labs have been reviewed.    Lab Results   Component Value Date    WBC 1.46 (LL) 07/23/2019    HGB 7.6 (L) 07/23/2019    HCT 22.7 (L) 07/23/2019    MCV 95 07/23/2019    PLT 74 (L) 07/23/2019         Diagnostic Results:  Bone marrow biopsy (5/6/19):  - In comparison to the prior marrow in October 2018 the marrow cellularity appears somewhat diminished at around 30%.  Blasts also appear to be somewhat decreased as a percentage of white cells, and I concur with the flow cytometry impression of 10% blast forms.  The clonal B-cell and plasma cell populations appear unchanged.  Plasmacytosis remains at approximately 7% with focal atypical plasma cell cytology.  Iron stains show increased stainable marrow iron within macrophages.  No ring sideroblasts are present.    Pathologist review of peripheral smear (7/18/19):  - Pancytopenia including anemia, macrocytic, severe; thrombocytopenia, moderate and leukopenia with severe granulocytopenia.  Negative for overt dyspoiesis or immature cells such as blasts.  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    Assessment/Plan:     MDS (myelodysplastic syndrome)  - I have reviewed his chart, including outside records from his primary hematologist/oncologist  Dr. Mak. His follow-up bone marrow in May 2019 suggested an interval improvement in his myelodysplastic syndrome with the use of azacitidine. Clinically, prior to this hospitalization, the frequency of his outpatient blood transfusions decreased, suggesting a response to therapy as well.  - the patient endorses seeing dark red stool. However, multiple scopes and CTA abdomen were negative for identifying a source of bleeding.  - he has received a total of 8 units of packed red blood cells over the past week.  - outside hospital had ordered several workup tests so far: direct antiglobulin test (negative), iron studies (show secondary iron overload from blood transfusions), reticulocytes (although elevated, when corrected for degree of anemia it suggests a hypo-proliferative marrow).  - since he has received dozens of units of blood since diagnosis of myelodysplastic syndrome, he likely has developed antibodies that lead to him not getting full responses to further blood transfusions (his hemoglobin will likely not rise ~1 g/dL with each unit due to inability to fully match blood due to antibodies).  - I will check uric acid, LDH with next lab draw (if patient remains in hospital).  - recommend limiting lab draws to no more than once daily.  - his baseline hemoglobin is between 7-9 g/dL, so he is currently at his baseline.  - I would feel comfortable with discharge and short-term follow-up with his hematologist/oncologist Dr. Mak. Patient and his wife also feel comfortable with this plan.  - I would defer repeating a bone marrow biopsy to Dr. Mak.        Thank you for your consult.     Satinder Marley MD  Hematology/Oncology  [facility]

## 2019-07-23 NOTE — PLAN OF CARE
Plan of care explained to patient and wife .AAOX3 BBS clear resps even unlabored.  Denied pain or discomfort throughout shift. Left chestwall PAC with occlusive dsg intact . Jackelyn at 5ml/hr. As per order . Periodically sat up in bed several times . Neutropenic Precautions initiated this shift. Sign at pts door . Call light in reach.

## 2019-07-23 NOTE — DISCHARGE SUMMARY
[facility] Medicine  Discharge Summary      Patient Name: Constance Garcia  MRN: [id number]  Admission Date: 7/21/2019  Hospital Length of Stay: 2 days  Discharge Date and Time: No discharge date for patient encounter.  Attending Physician: Quirino Soler DO   Discharging Provider: Quirino Soler DO  Primary Care Provider: Harmeet Pickering MD      HPI:   Constance Garcia is a 79 year old male with a past medical history of Hypertension, Chronic Pancytopenia 2/2 myelodysplastic syndrome (on Vidaza), with transfusions around every 2 weeks, and h/o hemolytic anemia. He lives in [place], [place] with his wife. His PCP is Dr. Harmeet Pickering.    He presented to [facility] 7/21/2019 for a low Hemoglobin of mid-5 on routine labs done by his cancer center. Initially, no report of Gastro Intestinal Bleed, so heme/onc consulted, no evidence of Hemolytic anemia, then his GIB presented itself with Hematochezia (dark red stool), and he went for EGD that was negative.  He continued to bleed and went for repeat EGD and c-scope earlier in the day, no source of bleed found.  Hgb this morning was 6.7, then transfused and 2pm Hgb is mid-8.  Patient received a total of 7 u pRBCs.  WBC 1.5 and plts 70K - chronic and baseline. Discussed with Dr Corbett, GI staff at [facility], who spoke with Dr Gresham who is on tomorrow, and he will be able to do double balloon enteroscopy tomorrow. Patient transferred to [facility] and admitted to Hospital medicine for further evaluation and treatment.    Procedure(s) (LRB):  single upper balloon enteroscopy (N/A)      Hospital Course:   7/22 pt seen at bedside, no having bloody BM at this time, pt had an EGD today, no acute finding, awaiting CTA, if neg then bleeding is related to his hemotologic disease.       There was no evidence of significant pathology in the entire jejunum.       The proximal ileum appeared normal.  Recommendation:     CTA as next step in  evaluation    7/23 the CTA abd was negative for acute evidence of GI bleed. Given the GI recs/scope findings/CTA results and pt's medical history significant for MDS, the pt can be discharged home today and follow up with his PCP and hematologist.  Case discussed with patient at bedside prior to discharge       Consults:   Consults (From admission, onward)        Status Ordering Provider     Inpatient consult to Gastroenterology  Once     Provider:  Le Lynn MD    Completed CHAIRS, KANDY COHN     Inpatient consult to Hematology/Oncology  Once     Provider:  Chalo Livingston MD    Ordered LE LYNN          No new Assessment & Plan notes have been filed under this hospital service since the last note was generated.  Service: Hospital Medicine    Final Active Diagnoses:    Diagnosis Date Noted POA    PRINCIPAL PROBLEM:  Occult GI bleeding [R19.5] 07/21/2019 Yes    MDS (myelodysplastic syndrome) [D46.9] 07/23/2019 Yes    Acute blood loss anemia [D62] 07/21/2019 Yes    Hypertension [I10] 07/21/2019 Yes      Problems Resolved During this Admission:       Discharged Condition: stable    Disposition: Home or Self Care    Follow Up:  Follow-up Information     Harmeet Pickering MD. Call in 3 days.    Specialty:  Internal Medicine  Contact information:  [address] [place] [address] [place] [address] [phone]                 Patient Instructions:      Diet Adult Regular     Notify your health care provider if you experience any of the following:  temperature >100.4     Notify your health care provider if you experience any of the following:  persistent nausea and vomiting or diarrhea     Notify your health care provider if you experience any of the following:  severe uncontrolled pain     Notify your health care provider if you experience any of the following:  difficulty breathing or increased cough     Notify your health care provider if you experience any of the following:  severe persistent headache     Notify your  health care provider if you experience any of the following:  worsening rash     Notify your health care provider if you experience any of the following:  persistent dizziness, light-headedness, or visual disturbances     Notify your health care provider if you experience any of the following:  increased confusion or weakness     Activity as tolerated       Significant Diagnostic Studies: Labs:   BMP:   Recent Labs   Lab 07/22/19 0622   *      K 3.4*      CO2 25   BUN 9   CREATININE 0.8   CALCIUM 8.0*   , CMP   Recent Labs   Lab 07/22/19 0622      K 3.4*      CO2 25   *   BUN 9   CREATININE 0.8   CALCIUM 8.0*   ANIONGAP 5*   ESTGFRAFRICA >60   EGFRNONAA >60   , CBC   Recent Labs   Lab 07/22/19 0622 07/23/19 0727   WBC 1.51* 1.46*   HGB 6.8* 7.6*   HCT 20.0* 22.7*   PLT 71* 74*   , INR   Lab Results   Component Value Date    INR 1.0 07/22/2019   , Lipid Panel No results found for: CHOL, HDL, LDLCALC, TRIG, CHOLHDL, Troponin No results for input(s): TROPONINI in the last 168 hours. and A1C: No results for input(s): HGBA1C in the last 4320 hours.  Radiology: CT scan:   CTA negative GI active bleeding.  Endoscopy: Colonoscopy: no acute evidence of GI bleed      Pending Diagnostic Studies:     Procedure Component Value Units Date/Time    Basic Metabolic Panel (BMP) [[id number]] Collected:  07/22/19 0622    Order Status:  Sent Lab Status:  No result     Specimen:  Blood          Medications:  Reconciled Home Medications:      Medication List      You have not been prescribed any medications.       Patient to continue on his home meds as previous to his admission to our hospital.    Indwelling Lines/Drains at time of discharge:   Lines/Drains/Airways     Central Venous Catheter Line                 Port A Cath Single Lumen 07/21/19 1900 left subclavian 1 day                Time spent on the discharge of patient: 37 minutes  Patient was seen and examined on the date of discharge and  determined to be suitable for discharge.         Quirino Soler DO  Department of Hospital Medicine  [facility]

## 2019-07-23 NOTE — TELEPHONE ENCOUNTER
Consult given to dr waller    ----- Message from Kell Osborne sent at 7/23/2019  7:49 AM CDT -----  Contact: Stacia ([facility]) [phone]  CONSULTS  Patient:  Facility:  Room /bed number:516  Referring MD: Dr. ARIANNA Love  Diagnosis:79 year old male with HTN CLL/SLL/AIHA/Pancytopenia   Person calling & phone number:Stacia [phone]

## 2019-07-23 NOTE — ASSESSMENT & PLAN NOTE
- I have reviewed his chart, including outside records from his primary hematologist/oncologist Dr. Mak. His follow-up bone marrow in May 2019 suggested an interval improvement in his myelodysplastic syndrome with the use of azacitidine. Clinically, prior to this hospitalization, the frequency of his outpatient blood transfusions decreased, suggesting a response to therapy as well.  - the patient endorses seeing dark red stool. However, multiple scopes and CTA abdomen were negative for identifying a source of bleeding.  - he has received a total of 8 units of packed red blood cells over the past week.  - outside hospital had ordered several workup tests so far: direct antiglobulin test (negative), iron studies (show secondary iron overload from blood transfusions), reticulocytes (although elevated, when corrected for degree of anemia it suggests a hypo-proliferative marrow).  - since he has received dozens of units of blood since diagnosis of myelodysplastic syndrome, he likely has developed antibodies that lead to him not getting full responses to further blood transfusions (his hemoglobin will likely not rise ~1 g/dL with each unit due to inability to fully match blood due to antibodies).  - recommend limiting lab draws to no more than once daily.  - his baseline hemoglobin is between 7-9 g/dL, so he is currently at his baseline.  - it appears a discharge order has been placed.  - I would feel comfortable with discharge and short-term follow-up with his hematologist/oncologist Dr. Mak. Patient and his wife also feel comfortable with this plan.  - I would defer repeating a bone marrow biopsy to Dr. Mak.

## 2019-07-24 ENCOUNTER — PATIENT OUTREACH (OUTPATIENT)
Dept: ADMINISTRATIVE | Facility: CLINIC | Age: 79
End: 2019-07-24

## 2019-07-24 NOTE — PATIENT INSTRUCTIONS
When You Have Gastrointestinal (GI) Bleeding    Blood in your vomit or stool can be a sign of gastrointestinal (GI) bleeding. GI bleeding can be scary. But the cause may not be serious. You should always see a doctor if GI bleeding occurs.  The GI tract  The GI tract is the path through which food travels in the body. Food passes from the mouth down the esophagus (the tube from the mouth to the stomach). Food begins to break down in the stomach. It then moves through the duodenum, the first part of the small intestine. Nutrients are absorbed as food travels through the small intestine. What is left passes into the colon (large intestine) as waste. The colon removes water from the waste. Waste continues from the colon to the rectum (where stool is stored). Waste then leaves the body through the anus.  Causes of GI bleeding  GI bleeding can be caused by many different problems. Some of the more common causes include:  · Swollen veins in the anus (hemorrhoids)  · Swollen veins in the esophagus (varices)  · Sore on the lining of the GI tract (ulcer)  · Cuts or scrapes in the mouth or throat  · Infection caused by germs such as bacteria or parasites  · Food allergies, such as milk allergy in young children  · Medicines  · Inflammation of the GI tract (gastritis or esophagitis)  · Colitis (Crohn's disease or ulcerative colitis)  · Cancer (tumors or polyps)  · Abnormal pouches in the colon (diverticula)  · Tears in the esophagus or anus  · Nosebleed  · Abnormal blood vessels in the GI tract (angiodysplasia)  Diagnosing the cause of blood in stool  If blood is coming out in your stool, you may have a lower GI tract problem or a very fast upper GI tract bleed. Bleeding from the GI tract can be bright red. Or it may look dark and tarry. Tests may also find blood in your stool that cant be seen with the eye (occult blood). To find out the cause, tests that may be ordered include:  · Blood tests. A blood sample is taken and  sent to a lab for exam.  · Hemoccult test. Checks a stool sample for blood.  · Stool culture. Checks a stool sample for bacteria or parasites.  · X-ray, ultrasound, or CT scan. Imaging tests that take pictures of the digestive tract.  · Colonoscopy or sigmoidoscopy. This test uses a flexible tube with a tiny camera. The tube is inserted through your anus into your rectum to see the inside of your colon. Your provider can also take a tiny tissue sample (biopsy) and treat a bleeding source  Diagnosing the cause of blood in vomit  If you are vomiting blood or something that looks like coffee grounds, you may have an upper GI tract problem. To find the cause, tests that may be done include:  · Upper Endoscopy. A flexible tube with a tiny camera is inserted through your mouth and throat to see inside your upper GI tract. This lets your provider take a tiny tissue sample (biopsy) and treat a bleeding source.  · Nasogastric lavage. This can tell if you have upper GI or lower GI bleeding.  · X-ray, ultrasound, or CT scan. Imaging tests that take pictures of your digestive tract.  · Upper GI series. X-rays of the upper part of your GI tract taken from inside your body.  · Enteroscopy. This sends a flexible tube or a small, swallowed capsule camera into your small intestine.  When to call your healthcare provider  Call your healthcare provider right away if you have any of the following:  · Bleeding from your mouth or anus that can't be stopped  · Fever of 100.4°F (38.0°) or higher  · Bleeding along with feeling lightheaded or dizzy  · Signs of fluid loss (dehydration). These include a dry, sticky mouth, decreased urine output; and very dark urine.  · Belly (abdominal) pain   Date Last Reviewed: 7/1/2016  © 9110-5027 [employer]. [address] [place] [address], [place] [address]. All rights reserved. This information is not intended as a substitute for professional medical care. Always follow your healthcare  professional's instructions.